# Patient Record
(demographics unavailable — no encounter records)

---

## 2021-01-01 NOTE — NUR
CM/SS visited with patient (mother of baby) for social service consult. 



Plan: Patient will discharge today, self care. No needs identified. 



Home: The patient lives at home with her significant other and 3 children. 



Supplies: The patient has a crib, pack-n-play, bassinet, bottles, diapers, clothes, formula, 
and car seat. She denies any further supply needs. 



Resources: The patient is set up with [facility]. She denies having resources with [employer] 
[employer], [employer],  or [facility] Three. CM/SS informed the patient about the 
[facility] Diaper stock availability if she was connected with a resource. She declined 
services at this time. 



Substance use: Denies past or current. 



Occupation: The patient reports both her and significant other are working. 



Support: The patient verbalized she has good support with family and friends. 



No further needs.

## 2021-01-01 NOTE — DISCHARGE INST-NURSERY
Discharge Inst-Nursery


Reconcile Patient Problems


Problems Reviewed?:  Yes





Instructions/Follow Up


Patient Instructions/Follow Up:  


with Dr. Valdes in one week





Activity


Avoid ALL Tobacco Products:  Second Hand Smoke





Diet


Pediatric Feeding Method:  Breast





Symptoms Report to Physician


[facility] to [facility] For:  


poor feeding or poor urine output.  Fever greater than 100.5


Parent Questions Call:  Call your physician


For Problems/Questions:  Contact Your Physician











NICK VALDES MD              Jan 12, 2021 07:16

## 2021-01-01 NOTE — NUR
1402 Vaginal delivery of viable baby girl per Dr. Freeman. Infant to mothers abdomen, dried 
and stimulated. Airway cleared with bulb syringe. 

1403 Stockinette hat on. Infant crying, MAEW, HR above 100, acrocyanotic

1405 Cord clamped by physician, cut by father

1407 ID bands #[id number] placed x1 infant ankle, x1 infant wrist, x1 moms wrist, x1 dads wrist

1408 Hugs tag applied   Vitamin K 1mg IM RAT

1410 Infant remains in mothers arms. Appropriate bonding noted. 

1412 Infant to preheated radiant warmer for weight and measurements   8 pounds 3 ounces   
3710 grams    20.5 inches

1413 Exam by Dr. Freeman   Erythromycin ointment OU

1415 Measurements done

1416 Footprints done

1419 VS checked. 

1420 Swaddled and to fathers arms. Teaching done about crib supplies and feeding/diaper 
record. Discussed delayed bathing and Glucose protocol.

## 2021-01-01 NOTE — NUR
Infant to nursery for initial bath, Hep B Vaccine per protocol. Infant double wrapped and 
returned to parents, no concerns at this time.

## 2021-01-01 NOTE — NUR
Infant to radiant warmer for initial assessment and gestational age assessment. Infant 
crying lustily at this time. Storkbites noted on bridge of nose and nape of neck. Infant has 
voided. Small amount of stool. Diaper changed. Infant swaddled and back to parents.

## 2021-01-01 NOTE — NUR
infant to [facility] for shift assessment and hearing screening.  hearing screen done and infant 
passed bilaterally.  skin color pink tones. resp unlabored with breath sounds CTA.  HRRR. 
abd soft with positive bowel sounds.  cord stump drying without drainage.  diaper clean dry 
and intact.  infant moves all extremities actively . appropriate bonding noted.

## 2021-01-01 NOTE — NEWBORN INFANT-DISCHARGE
[facility] Infant Discharge


Subjective/Events-Last Exam


As of the morning of 2021 patient is taking both breast milk as well

as formula supplementation.  Mother reports her daughter has urinated and had 

multiple bowel movements already.


Date Patient Was Seen:  2021


Time Patient Was Seen:  06:55





Condition/Feeding


 Feeding Method:  Breast Milk-Exclusive





Discharge Examination


Level of Alertness:  Alert


Activity/State:  Active Alert


Skin Comments:  


stork bites to nape of neck and bridge of nose


Head Circumference:  13.75


Fontanelles:  Soft


Anterior [place] Descriptio:  WNL


Cephalohematoma:  No


Sclera Description:  Clear


Ears:  Normal


Mouth, Nose, Eyes:  Hard & Soft Palate Intact


Neck:  Head Mobile, Clavicles Intact


Chest Circumference:  13.50


Cardiovascular:  Regular Rhythm


Respiratory:  Regular, Unlabored


Breath Sounds:  Clear, Equal


Caput Succedaneum:  No


Abdomen:  Soft


Abdomen Circumference:  13.25


Genitalia:  Appear Normal


Back:  Spine Closed


Hips:  WNL


Movement:  Symmetric-Body


Muscle Tone:  Active


Extremities:  5 digits present on each extremity





Weight/Height


Height (Inches):  20.50


Height (Calculated Centimeters:  52.786087


Weight (Pounds):  7


Weight (Ounces):  15.0


Weight (Calculated Kilograms):  3.807069


Weight (Calculated Grams):  3600.389





Vital Signs/Labs/SS


Vital Signs





Vital Signs








  Date Time  Temp Pulse Resp B/P (MAP) Pulse Ox O2 Delivery O2 Flow Rate FiO2


 


21 20:27 36.7 160 58     


 


21 16:25 36.9 172 58     


 


21 15:15 37.0 152 50     


 


21 14:45 37.0 130 48     


 


21 14:19 36.5 155 64     








Labs


Laboratory Tests


21 16:25: Glucometer 44


21 02:35:  Total Bilirubin 3.6L





Discharge Diagnosis/Plan


Discharge Diagnosis/Impression:  Birth (), Infant (female), Living, Term 

(39w3d)


Plan


1.  Patient will be dismissed to home this afternoon after 24 hours provided 

clinically doing well and laboratory is reassuring.


-She will continue with breast-feeding as primary with formula supplementation


-Follow-up with Dr. Valdes in one week.











NICK VALDES MD              2021 07:16

## 2021-01-01 NOTE — NUR
home care instructions reviewed with parents. bracelets matched follow up appointment 
reviewed.  mother acknowledges understanding of instructions verbally and with her 
signature.  parents preparing for discharge to home .
